# Patient Record
Sex: MALE | Race: WHITE | URBAN - METROPOLITAN AREA
[De-identification: names, ages, dates, MRNs, and addresses within clinical notes are randomized per-mention and may not be internally consistent; named-entity substitution may affect disease eponyms.]

---

## 2024-08-23 ENCOUNTER — HOSPITAL ENCOUNTER (EMERGENCY)
Age: 24
Discharge: HOME | End: 2024-08-23
Payer: COMMERCIAL

## 2024-08-23 ENCOUNTER — APPOINTMENT (OUTPATIENT)
Dept: GENERAL RADIOLOGY | Age: 24
End: 2024-08-23
Payer: COMMERCIAL

## 2024-08-23 VITALS
HEART RATE: 76 BPM | BODY MASS INDEX: 35.79 KG/M2 | OXYGEN SATURATION: 99 % | DIASTOLIC BLOOD PRESSURE: 89 MMHG | WEIGHT: 250 LBS | SYSTOLIC BLOOD PRESSURE: 139 MMHG | RESPIRATION RATE: 18 BRPM | TEMPERATURE: 98.8 F | HEIGHT: 70 IN

## 2024-08-23 DIAGNOSIS — M25.462 EFFUSION OF LEFT KNEE: Primary | ICD-10-CM

## 2024-08-23 DIAGNOSIS — M25.562 LEFT KNEE PAIN, UNSPECIFIED CHRONICITY: ICD-10-CM

## 2024-08-23 PROCEDURE — 6370000000 HC RX 637 (ALT 250 FOR IP): Performed by: PHYSICIAN ASSISTANT

## 2024-08-23 PROCEDURE — 99283 EMERGENCY DEPT VISIT LOW MDM: CPT

## 2024-08-23 PROCEDURE — 73560 X-RAY EXAM OF KNEE 1 OR 2: CPT

## 2024-08-23 RX ORDER — LIDOCAINE 4 G/G
1 PATCH TOPICAL ONCE
Status: DISCONTINUED | OUTPATIENT
Start: 2024-08-23 | End: 2024-08-24 | Stop reason: HOSPADM

## 2024-08-23 RX ORDER — LIDOCAINE 50 MG/G
1 PATCH TOPICAL DAILY
Qty: 10 PATCH | Refills: 0 | Status: SHIPPED | OUTPATIENT
Start: 2024-08-23 | End: 2024-09-02

## 2024-08-23 RX ORDER — IBUPROFEN 800 MG/1
800 TABLET, FILM COATED ORAL ONCE
Status: DISCONTINUED | OUTPATIENT
Start: 2024-08-23 | End: 2024-08-23

## 2024-08-23 RX ORDER — IBUPROFEN 600 MG/1
600 TABLET, FILM COATED ORAL 3 TIMES DAILY PRN
Qty: 30 TABLET | Refills: 0 | Status: SHIPPED | OUTPATIENT
Start: 2024-08-23

## 2024-08-23 RX ORDER — ACETAMINOPHEN 500 MG
1000 TABLET ORAL ONCE
Status: COMPLETED | OUTPATIENT
Start: 2024-08-23 | End: 2024-08-23

## 2024-08-23 RX ADMIN — ACETAMINOPHEN 1000 MG: 500 TABLET ORAL at 22:32

## 2024-08-23 ASSESSMENT — PAIN SCALES - GENERAL
PAINLEVEL_OUTOF10: 4

## 2024-08-23 ASSESSMENT — ENCOUNTER SYMPTOMS
ABDOMINAL PAIN: 0
NAUSEA: 0
SHORTNESS OF BREATH: 0
RHINORRHEA: 0
COUGH: 0
VOMITING: 0
DIARRHEA: 0

## 2024-08-23 ASSESSMENT — PAIN DESCRIPTION - ORIENTATION: ORIENTATION: LEFT

## 2024-08-23 ASSESSMENT — PAIN - FUNCTIONAL ASSESSMENT: PAIN_FUNCTIONAL_ASSESSMENT: 0-10

## 2024-08-23 ASSESSMENT — PAIN DESCRIPTION - DESCRIPTORS: DESCRIPTORS: ACHING

## 2024-08-23 ASSESSMENT — PAIN DESCRIPTION - LOCATION: LOCATION: KNEE

## 2024-08-24 NOTE — ED PROVIDER NOTES
[facility] EMERGENCY DEPARTMENT  EMERGENCY DEPARTMENT ENCOUNTER        Pt Name: Navjot Perez  MRN: [id number]  Birthdate 2000  Date of evaluation: 8/23/2024  Provider: Cindi Groves PA-C  PCP: No primary care provider on file.  Note Started: 10:32 PM EDT 8/23/24      GRIFFIN. I have evaluated this patient.        CHIEF COMPLAINT       Chief Complaint   Patient presents with    Knee Pain     Pt state that he always have some off and on pain in left knee. Pt state that last night the pain increased and today is very swollen       HISTORY OF PRESENT ILLNESS: 1 or more Elements     History From: Patient  Limitations to history : None    Navjot Perez is a 24 y.o. male who presents to the emergency department today for evaluation for concerns of left knee pain.  The patient reports that he is on experiencing intermittent knee pain for some time.  Patient reports that he noticed some increasing pain and swelling to his left knee last night.  He denies fall.  He is currently rating his pain as a 4/10 he did take ibuprofen before coming.  He has not had any fevers.  He has no numbness, ting or weakness previously would ambulate.  He denies any other complaints or symptoms    Nursing Notes were all reviewed and agreed with or any disagreements were addressed in the HPI.    REVIEW OF SYSTEMS :      Review of Systems   Constitutional:  Negative for activity change, appetite change, chills and fever.   HENT:  Negative for congestion and rhinorrhea.    Respiratory:  Negative for cough and shortness of breath.    Cardiovascular:  Negative for chest pain.   Gastrointestinal:  Negative for abdominal pain, diarrhea, nausea and vomiting.   Genitourinary:  Negative for difficulty urinating, dysuria and hematuria.   Musculoskeletal:  Positive for arthralgias.   Neurological:  Negative for weakness and numbness.       Positives and Pertinent negatives as per HPI.     SURGICAL HISTORY   No past surgical history on  health    CC/HPI Summary, DDx, ED Course, and Reassessment: Briefly, this is a 24-year-old male who presents the emergency department today for evaluation for pain and swelling to the knee.  No fall or injury.  He states that he has had some intermittent symptoms for some time but then worsened yesterday    On examination there is tenderness and edema noted over the left knee, increasing medially.  No erythema or warmth and he is otherwise neurovascularly intact.    Disposition Considerations (tests considered but not done, Admit vs D/C, Shared Decision Making, Pt Expectation of Test or Tx.):    X-ray of the left knee shows a large short effusion however there is no fracture or dislocation    .  Patient be given an Ace wrap here will be given Lidoderm patches and ibuprofen for home.  I did discuss with patient that he will need to follow-up with orthopedics within the next week for concern of a possible tendon meniscus or ligamentous injury.  He will be given a work note.  He is to obtain follow-up with orthopedics within 1 week.  He is to return to the ED for new or worsening symptoms stable for discharge. No results found for this visit on 08/23/24.    I estimate there is LOW risk for COMPARTMENT SYNDROME, DEEP VENOUS THROMBOSIS, SEPTIC ARTHRITIS, TENDON OR NEUROVASCULAR INJURY, thus I consider the discharge disposition reasonable. Navjot Perez and I have discussed the diagnosis and risks, and we agree with discharging home to follow-up with their primary doctor or the referral orthopedist. We also discussed returning to the Emergency Department immediately if new or worsening symptoms occur. We have discussed the symptoms which are most concerning (e.g., changing or worsening pain, numbness, weakness) that necessitate immediate return.    Final Impression    1. Effusion of left knee    2. Left knee pain, unspecified chronicity        Blood pressure 139/89, pulse 76, temperature 100.2 °F (37.9 °C), resp. rate 18,

## 2024-08-27 ENCOUNTER — OFFICE VISIT (OUTPATIENT)
Dept: ORTHOPEDIC SURGERY | Age: 24
End: 2024-08-27
Payer: COMMERCIAL

## 2024-08-27 VITALS — BODY MASS INDEX: 37.03 KG/M2 | RESPIRATION RATE: 12 BRPM | WEIGHT: 250 LBS | HEIGHT: 69 IN

## 2024-08-27 DIAGNOSIS — M25.562 ACUTE PAIN OF BOTH KNEES: Primary | ICD-10-CM

## 2024-08-27 DIAGNOSIS — M25.561 ACUTE PAIN OF BOTH KNEES: Primary | ICD-10-CM

## 2024-08-27 PROCEDURE — 99203 OFFICE O/P NEW LOW 30 MIN: CPT

## 2024-08-27 RX ORDER — NAPROXEN 500 MG/1
500 TABLET ORAL 2 TIMES DAILY WITH MEALS
Qty: 60 TABLET | Refills: 1 | Status: SHIPPED | OUTPATIENT
Start: 2024-08-27

## 2024-08-27 NOTE — PROGRESS NOTES
Chief Complaint    Knee Pain (NP, bilateral knees.  Left greater than right.  States pain began on 8/22/24.  No known injury.  )      History of Present Illness:  Navjot Perez is a 24 y.o. male who presents for initial consultation of bilateral knee pain. He has had chronic knee pain for the past 3 years. On 8/29/24 he had a sudden increase in pain without trauma. His knees had severe swelling which prompted visit to ER. Since then he has had lateral knee pain with buckling.  Patient describes their pain as 6/10, dull, achy, sharp, worse with knee flexion. The patient denies any specific injury. The patient denies any numbness, paresthesias, or weakness.     Navjot is working full time as an [employer] .      Pain Assessment  Location of Pain: Knee  Location Modifiers: Right, Left  Severity of Pain: 6  Quality of Pain: Dull, Aching, Other (Comment), Sharp (swelling; tightness)  Duration of Pain: Persistent  Frequency of Pain: Constant  Aggravating Factors: Walking, Standing, Bending  Limiting Behavior: Yes  Relieving Factors: Rest  Result of Injury: No  Work-Related Injury: No  Are there other pain locations you wish to document?: No    History reviewed. No pertinent past medical history.     History reviewed. No pertinent surgical history.    History reviewed. No pertinent family history.    Social History     Socioeconomic History    Marital status: Single     Spouse name: None    Number of children: None    Years of education: None    Highest education level: None   Tobacco Use    Smoking status: Never    Smokeless tobacco: Never   Substance and Sexual Activity    Alcohol use: Not Currently    Drug use: Never       Current Outpatient Medications   Medication Sig Dispense Refill    naproxen (NAPROSYN) 500 MG tablet Take 1 tablet by mouth 2 times daily (with meals) 60 tablet 1    ibuprofen (ADVIL;MOTRIN) 600 MG tablet Take 1 tablet by mouth 3 times daily as needed for Pain 30 tablet 0    lidocaine

## 2024-08-28 ENCOUNTER — TELEPHONE (OUTPATIENT)
Dept: ORTHOPEDIC SURGERY | Age: 24
End: 2024-08-28

## 2024-08-28 NOTE — TELEPHONE ENCOUNTER
General Question     Subject: REQUESTING A REFERRAL FOR A MRI AT [facility].  Patient and /or Facility Request: Navjot Perze   Contact Number: [phone]

## 2024-08-28 NOTE — TELEPHONE ENCOUNTER
SVETLANA/m on v/m Navjot Perez  regarding MRI Bilateral Knee approval and authorization being valid until 08/27/2025.  Patient was instructed that their MRI's needs to be scheduled at [facility]. The patient was instructed to contact the facility to schedule  at [phone].    A follow up appointment will need to be scheduled to review the results and treatment plan.     The patient has elected to contact the office at a later time to schedule a follow up appointment.

## 2024-09-04 ENCOUNTER — TELEPHONE (OUTPATIENT)
Dept: ORTHOPEDIC SURGERY | Age: 24
End: 2024-09-04

## 2024-09-10 ENCOUNTER — TELEPHONE (OUTPATIENT)
Dept: ORTHOPEDIC SURGERY | Age: 24
End: 2024-09-10

## 2024-09-19 ENCOUNTER — OFFICE VISIT (OUTPATIENT)
Dept: ORTHOPEDIC SURGERY | Age: 24
End: 2024-09-19
Payer: COMMERCIAL

## 2024-09-19 VITALS — WEIGHT: 250 LBS | HEIGHT: 69 IN | BODY MASS INDEX: 37.03 KG/M2

## 2024-09-19 DIAGNOSIS — M25.462 EFFUSION OF BOTH KNEE JOINTS: Primary | ICD-10-CM

## 2024-09-19 DIAGNOSIS — M25.461 EFFUSION OF BOTH KNEE JOINTS: Primary | ICD-10-CM

## 2024-09-19 DIAGNOSIS — M25.462 EFFUSION OF BOTH KNEE JOINTS: ICD-10-CM

## 2024-09-19 DIAGNOSIS — M25.461 EFFUSION OF BOTH KNEE JOINTS: ICD-10-CM

## 2024-09-19 DIAGNOSIS — M22.2X2 PATELLOFEMORAL SYNDROME OF BOTH KNEES: ICD-10-CM

## 2024-09-19 DIAGNOSIS — M22.2X1 PATELLOFEMORAL SYNDROME OF BOTH KNEES: ICD-10-CM

## 2024-09-19 LAB
APPEARANCE FLUID: NORMAL
BDY FLUID QUALITY: NORMAL
CELL COUNT FLUID TYPE: NORMAL
COLOR FLUID: NORMAL
CRYSTALS FLD MICRO: NORMAL
EOSINOPHIL NFR FLD MANUAL: 1 %
LYMPHOCYTES NFR FLD: 8 %
MONOCYTES NFR FLD: 1 %
NEUTROPHIL, FLUID: 90 %
NUC CELL # FLD: 1085 /CUMM
RBC FLUID: NORMAL /CUMM
SPECIMEN SOURCE FLD: NORMAL
SPECIMEN VOL FLD: 4 ML
TOTAL CELLS COUNTED FLD: 100

## 2024-09-19 PROCEDURE — 20610 DRAIN/INJ JOINT/BURSA W/O US: CPT

## 2024-09-19 PROCEDURE — 99214 OFFICE O/P EST MOD 30 MIN: CPT

## 2024-09-19 RX ORDER — ROPIVACAINE HYDROCHLORIDE 5 MG/ML
10 INJECTION, SOLUTION EPIDURAL; INFILTRATION; PERINEURAL ONCE
Status: COMPLETED | OUTPATIENT
Start: 2024-09-19 | End: 2024-09-19

## 2024-09-19 RX ADMIN — ROPIVACAINE HYDROCHLORIDE 10 ML: 5 INJECTION, SOLUTION EPIDURAL; INFILTRATION; PERINEURAL at 15:13

## 2024-09-20 LAB — ACID FAST STN SPEC QL: NORMAL

## 2024-10-01 LAB
ACID FAST STN SPEC QL: NORMAL
MYCOBACTERIUM SPEC CULT: NORMAL

## 2024-10-08 LAB
ACID FAST STN SPEC QL: NORMAL
MYCOBACTERIUM SPEC CULT: NORMAL

## 2024-10-15 LAB
ACID FAST STN SPEC QL: NORMAL
MYCOBACTERIUM SPEC CULT: NORMAL

## 2024-10-20 ENCOUNTER — TELEPHONE (OUTPATIENT)
Dept: ORTHOPEDIC SURGERY | Age: 24
End: 2024-10-20

## 2024-10-22 LAB
ACID FAST STN SPEC QL: NORMAL
MYCOBACTERIUM SPEC CULT: NORMAL

## 2024-10-29 LAB
ACID FAST STN SPEC QL: NORMAL
MYCOBACTERIUM SPEC CULT: NORMAL

## 2024-11-05 LAB
ACID FAST STN SPEC QL: NORMAL
MYCOBACTERIUM SPEC CULT: NORMAL